# Patient Record
Sex: FEMALE | Race: WHITE | Employment: FULL TIME | URBAN - METROPOLITAN AREA
[De-identification: names, ages, dates, MRNs, and addresses within clinical notes are randomized per-mention and may not be internally consistent; named-entity substitution may affect disease eponyms.]

---

## 2022-05-28 ENCOUNTER — OFFICE VISIT (OUTPATIENT)
Dept: PRIMARY CARE CLINIC | Age: 62
End: 2022-05-28
Payer: COMMERCIAL

## 2022-05-28 VITALS
TEMPERATURE: 98.2 F | OXYGEN SATURATION: 98 % | SYSTOLIC BLOOD PRESSURE: 122 MMHG | HEIGHT: 60 IN | DIASTOLIC BLOOD PRESSURE: 83 MMHG | BODY MASS INDEX: 32.2 KG/M2 | WEIGHT: 164 LBS | HEART RATE: 70 BPM

## 2022-05-28 DIAGNOSIS — L23.9 ALLERGIC CONTACT DERMATITIS, UNSPECIFIED TRIGGER: Primary | ICD-10-CM

## 2022-05-28 PROCEDURE — 96372 THER/PROPH/DIAG INJ SC/IM: CPT | Performed by: NURSE PRACTITIONER

## 2022-05-28 PROCEDURE — 1036F TOBACCO NON-USER: CPT | Performed by: NURSE PRACTITIONER

## 2022-05-28 PROCEDURE — 99213 OFFICE O/P EST LOW 20 MIN: CPT | Performed by: NURSE PRACTITIONER

## 2022-05-28 PROCEDURE — G8417 CALC BMI ABV UP PARAM F/U: HCPCS | Performed by: NURSE PRACTITIONER

## 2022-05-28 PROCEDURE — G8427 DOCREV CUR MEDS BY ELIG CLIN: HCPCS | Performed by: NURSE PRACTITIONER

## 2022-05-28 PROCEDURE — 3017F COLORECTAL CA SCREEN DOC REV: CPT | Performed by: NURSE PRACTITIONER

## 2022-05-28 RX ORDER — METHYLPREDNISOLONE SODIUM SUCCINATE 125 MG/2ML
125 INJECTION, POWDER, LYOPHILIZED, FOR SOLUTION INTRAMUSCULAR; INTRAVENOUS ONCE
Status: COMPLETED | OUTPATIENT
Start: 2022-05-28 | End: 2022-05-28

## 2022-05-28 RX ORDER — PREDNISONE 20 MG/1
20 TABLET ORAL DAILY
Qty: 15 TABLET | Refills: 0 | Status: SHIPPED | OUTPATIENT
Start: 2022-05-29

## 2022-05-28 RX ORDER — HYDROXYZINE HYDROCHLORIDE 25 MG/1
25 TABLET, FILM COATED ORAL 3 TIMES DAILY PRN
Qty: 20 TABLET | Refills: 0 | Status: SHIPPED | OUTPATIENT
Start: 2022-05-28 | End: 2022-06-07

## 2022-05-28 RX ORDER — LEVOTHYROXINE SODIUM 112 UG/1
TABLET ORAL
COMMUNITY
Start: 2022-05-20

## 2022-05-28 RX ADMIN — METHYLPREDNISOLONE SODIUM SUCCINATE 125 MG: 125 INJECTION, POWDER, LYOPHILIZED, FOR SOLUTION INTRAMUSCULAR; INTRAVENOUS at 14:09

## 2022-05-28 ASSESSMENT — ENCOUNTER SYMPTOMS
ABDOMINAL PAIN: 0
SINUS PAIN: 0
SORE THROAT: 0
VOMITING: 0
DIARRHEA: 0
NAUSEA: 0
SHORTNESS OF BREATH: 0
COUGH: 0

## 2022-05-28 ASSESSMENT — PATIENT HEALTH QUESTIONNAIRE - PHQ9
2. FEELING DOWN, DEPRESSED OR HOPELESS: 0
SUM OF ALL RESPONSES TO PHQ QUESTIONS 1-9: 0
SUM OF ALL RESPONSES TO PHQ9 QUESTIONS 1 & 2: 0
SUM OF ALL RESPONSES TO PHQ QUESTIONS 1-9: 0
1. LITTLE INTEREST OR PLEASURE IN DOING THINGS: 0

## 2022-05-28 NOTE — PROGRESS NOTES
[address] [facility]  [address] [address]  Dept: [phone]  Dept Fax: [phone]    Param Shelby is a 57 y.o. female who presents today for her medicalconditions/complaints as noted below. Param Shelby is c/o of Rash (started on back below scalp that radiated down and on neck/chest area and abdomen)      HPI:         78-year-old female patient presents with complaints of rash. Patient reports that she got her hair colored 3 days ago. Reports following this she has developed rash, redness, itching present to the posterior scalp, upper neck upper chest.  Has had some areas to the lower back. Has been trying Benadryl as needed. No past medical history on file. Current Outpatient Medications   Medication Sig Dispense Refill    levothyroxine (SYNTHROID) 112 MCG tablet       Calcium Carbonate-Vitamin D (CALCIUM CARBONATE W/VITAMIN D PO) Take 1 tablet by mouth daily      [START ON 5/29/2022] predniSONE (DELTASONE) 20 MG tablet Take 1 tablet by mouth daily Take 3 tabs po x 2 days, 2 tabs po x 3 days, 1 tab po x 2 days, half tab po x 2 days 15 tablet 0    hydrOXYzine (ATARAX) 25 MG tablet Take 1 tablet by mouth 3 times daily as needed for Itching 20 tablet 0     No current facility-administered medications for this visit. Allergies   Allergen Reactions    Grass Extracts [Gramineae Pollens]        Subjective:      Review of Systems   Constitutional: Negative for chills and fever. HENT: Negative for ear pain, sinus pain and sore throat. Respiratory: Negative for cough and shortness of breath. Cardiovascular: Negative for chest pain and palpitations. Gastrointestinal: Negative for abdominal pain, diarrhea, nausea and vomiting. Skin: Positive for rash. Neurological: Negative for dizziness and headaches.    All other systems reviewed and are negative.      :Objective     Physical Exam  Vitals and nursing note reviewed. Constitutional:       General: She is not in acute distress. Appearance: Normal appearance. She is not toxic-appearing. Cardiovascular:      Rate and Rhythm: Normal rate. Pulmonary:      Effort: Pulmonary effort is normal.      Breath sounds: Normal breath sounds. Skin:     General: Skin is warm and dry. Findings: Rash present. Neurological:      General: No focal deficit present. Mental Status: She is alert and oriented to person, place, and time. /83   Pulse 70   Temp 98.2 °F (36.8 °C) (Tympanic)   Ht 5' (1.524 m)   Wt 164 lb (74.4 kg)   SpO2 98%   BMI 32.03 kg/m²     Lab Review   No visits with results within 6 Month(s) from this visit.    Latest known visit with results is:   Hospital Outpatient Visit on 06/24/2013   Component Date Value    WBC 06/24/2013 4.6     RBC 06/24/2013 4.82     Hemoglobin 06/24/2013 14.8     Hematocrit 06/24/2013 43.8     MCV 06/24/2013 90.8     MCH 06/24/2013 30.7     MCHC 06/24/2013 33.8     RDW 06/24/2013 13.6     Platelets 08/33/8142 248     MPV 06/24/2013 8.8     Glucose 06/24/2013 92     BUN 06/24/2013 21*    CREATININE 06/24/2013 0.77     Bun/Cre Ratio 06/24/2013 NOT REPORTED     Calcium 06/24/2013 9.3     Sodium 06/24/2013 142     Potassium 06/24/2013 4.5     Chloride 06/24/2013 110     CO2 06/24/2013 26     Anion Gap 06/24/2013 10     Alkaline Phosphatase 06/24/2013 59     ALT 06/24/2013 23     AST 06/24/2013 23     Total Bilirubin 06/24/2013 0.51     Total Protein 06/24/2013 6.9     Albumin 06/24/2013 4.1     Albumin/Globulin Ratio 06/24/2013 1.5     GFR Non- 06/24/2013 >60     GFR  06/24/2013 >60     GFR Comment 06/24/2013          GFR Staging 06/24/2013 NOT REPORTED     Cholesterol 06/24/2013 220*    HDL 06/24/2013 63     LDL Cholesterol 06/24/2013 130*    Chol/HDL Ratio 06/24/2013 3.5     Triglycerides 06/24/2013 137  VLDL 06/24/2013 NOT REPORTED     TSH 06/24/2013 11.79*       Assessment and Plan      1. Allergic contact dermatitis, unspecified trigger  -     methylPREDNISolone sodium (SOLU-MEDROL) injection 125 mg; 125 mg, IntraMUSCular, ONCE, 1 dose, On Sat 5/28/22 at 1415  -     predniSONE (DELTASONE) 20 MG tablet; Take 1 tablet by mouth daily Take 3 tabs po x 2 days, 2 tabs po x 3 days, 1 tab po x 2 days, half tab po x 2 days, Disp-15 tablet, R-0Normal  -     hydrOXYzine (ATARAX) 25 MG tablet; Take 1 tablet by mouth 3 times daily as needed for Itching, Disp-20 tablet, R-0Normal         Administrations This Visit     methylPREDNISolone sodium (SOLU-MEDROL) injection 125 mg     Admin Date  05/28/2022 Action  Given Dose  125 mg Route  IntraMUSCular Administered By  Jeff Fabian MA              IM injection of steroids now  Start prednisone tomorrow  Hydroxyzine prn for itching  F/u with pcp      No results found for this visit on 05/28/22. Return if symptoms worsen or fail to improve. Orders Placed This Encounter   Medications    methylPREDNISolone sodium (SOLU-MEDROL) injection 125 mg    predniSONE (DELTASONE) 20 MG tablet     Sig: Take 1 tablet by mouth daily Take 3 tabs po x 2 days, 2 tabs po x 3 days, 1 tab po x 2 days, half tab po x 2 days     Dispense:  15 tablet     Refill:  0    hydrOXYzine (ATARAX) 25 MG tablet     Sig: Take 1 tablet by mouth 3 times daily as needed for Itching     Dispense:  20 tablet     Refill:  0        Patient given educational materials - see patient instructions. Discussed use, benefit, and side effects of prescribed medications. All patientquestions answered. Pt voiced understanding. Patient given educational materials - see patient instructions. Discussed use, benefit, and side effects of prescribed medications. All patientquestions answered. Pt voiced understanding. This note was transcribed using dictation with Dragon services.  Efforts were made to correct any errors but some words may be misinterpreted.     Patient assumes risks associated with failure to complete recommended testing and treatments in a timely manner    Electronically signed by OKSANA Gray CNP on 5/28/2022at 2:37 PM

## 2022-05-28 NOTE — PATIENT INSTRUCTIONS
Patient Education        Allergic Reaction: Care Instructions  Your Care Instructions     An allergic reaction is an excessive response from your immune system to a medicine, chemical, food, insect bite, or other substance. A reaction can range from mild to life-threatening. Some people have a mild rash, hives, and itching or stomach cramps. In severe reactions, swelling of your tongue and throat canclose up your airway so that you cannot breathe. Follow-up care is a key part of your treatment and safety. Be sure to make and go to all appointments, and call your doctor if you are having problems. It's also a good idea to know your test results and keep alist of the medicines you take. How can you care for yourself at home?  If you know what caused your allergic reaction, be sure to avoid it. Your allergy may become more severe each time you have a reaction.  Take an over-the-counter antihistamine, such as cetirizine (Zyrtec) or loratadine (Claritin), to treat mild symptoms. Read and follow directions on the label. Some antihistamines can make you feel sleepy. Do not give antihistamines to a child unless you have checked with your doctor first. Mild symptoms include sneezing or an itchy or runny nose; an itchy mouth; a few hives or mild itching; and mild nausea or stomach discomfort.  Do not scratch hives or a rash. Put a cold, moist towel on them or take cool baths to relieve itching. Put ice packs on hives, swelling, or insect stings for 10 to 15 minutes at a time. Put a thin cloth between the ice pack and your skin. Do not take hot baths or showers. They will make the itching worse.  Your doctor may prescribe a shot of epinephrine to carry with you in case you have a severe reaction. Learn how to give yourself the shot and keep it with you at all times. Make sure it is not .    Go to the emergency room every time you have a severe reaction, even if you have used your shot of epinephrine and are feeling better. Symptoms can come back after a shot.  Wear medical alert jewelry that lists your allergies. You can buy this at most drugstores.  If your child has a severe allergy, make sure that his or her teachers, babysitters, coaches, and other caregivers know about the allergy. They should have an epinephrine shot, know how and when to give it, and have a plan to take your child to the hospital.  When should you call for help? Give an epinephrine shot if:     You think you are having a severe allergic reaction.      You have symptoms in more than one body area, such as mild nausea and an itchy mouth. After giving an epinephrine shot call 911, even if you feel better. Call 911 if:     You have symptoms of a severe allergic reaction. These may include:  ? Sudden raised, red areas (hives) all over your body. ? Swelling of the throat, mouth, lips, or tongue. ? Trouble breathing. ? Passing out (losing consciousness). Or you may feel very lightheaded or suddenly feel weak, confused, or restless.      You have been given an epinephrine shot, even if you feel better. Call your doctor now or seek immediate medical care if:     You have symptoms of an allergic reaction, such as:  ? A rash or hives (raised, red areas on the skin). ? Itching. ? Swelling. ? Belly pain, nausea, or vomiting. Watch closely for changes in your health, and be sure to contact your doctor if:     You do not get better as expected. Where can you learn more? Go to [email or web address]://[email or web address].com. org and sign in to your [employer] account. Enter N313 in the [facility] box to learn more about \"Allergic Reaction: Care Instructions. \"     If you do not have an account, please click on the \"Sign Up Now\" link. Current as of: February 10, 2021               Content Version: 13.2  © 4991-1321 [employer]. Care instructions adapted under license by [facility] [place] ([facility]).  If you have questions about a medical condition or this instruction, always ask your healthcare professional. [place] [address] any warranty or liability for your use of this information.